# Patient Record
Sex: FEMALE | Race: WHITE | URBAN - METROPOLITAN AREA
[De-identification: names, ages, dates, MRNs, and addresses within clinical notes are randomized per-mention and may not be internally consistent; named-entity substitution may affect disease eponyms.]

---

## 2022-09-16 ENCOUNTER — OFFICE VISIT (OUTPATIENT)
Dept: URBAN - METROPOLITAN AREA CLINIC 82 | Facility: CLINIC | Age: 37
End: 2022-09-16
Payer: COMMERCIAL

## 2022-09-16 DIAGNOSIS — H52.223 REGULAR ASTIGMATISM, BILATERAL: Primary | ICD-10-CM

## 2022-09-16 PROCEDURE — 92310 CONTACT LENS FITTING OU: CPT | Performed by: OPTOMETRIST

## 2022-09-16 PROCEDURE — 92004 COMPRE OPH EXAM NEW PT 1/>: CPT | Performed by: OPTOMETRIST

## 2022-09-16 PROCEDURE — 92082 INTERMEDIATE VISUAL FIELD XM: CPT | Performed by: OPTOMETRIST

## 2022-09-16 ASSESSMENT — INTRAOCULAR PRESSURE
OD: 16
OS: 12

## 2022-09-16 ASSESSMENT — VISUAL ACUITY
OS: 20/20
OD: 20/20

## 2022-09-16 ASSESSMENT — KERATOMETRY
OD: 45.00
OS: 2272.13

## 2022-09-16 NOTE — IMPRESSION/PLAN
Impression: Regular astigmatism, bilateral: H52.223. Plan: New glasses RX and CL RX given today. Follow Up with PCP for BP/glucose.

## 2024-11-11 ENCOUNTER — OFFICE VISIT (OUTPATIENT)
Dept: URBAN - METROPOLITAN AREA CLINIC 82 | Facility: CLINIC | Age: 39
End: 2024-11-11
Payer: COMMERCIAL

## 2024-11-11 DIAGNOSIS — H52.223 REGULAR ASTIGMATISM, BILATERAL: Primary | ICD-10-CM

## 2024-11-11 DIAGNOSIS — Z13.5 ENCOUNTER FOR SCREENING FOR EYE AND EAR DISORDERS: ICD-10-CM

## 2024-11-11 PROCEDURE — 92014 COMPRE OPH EXAM EST PT 1/>: CPT | Performed by: OPTOMETRIST

## 2024-11-11 ASSESSMENT — INTRAOCULAR PRESSURE
OS: 13
OD: 12

## 2024-11-11 ASSESSMENT — VISUAL ACUITY
OS: 20/25
OD: 20/25

## 2024-11-11 ASSESSMENT — KERATOMETRY
OD: 44.75
OS: 44.50